# Patient Record
Sex: MALE | Race: WHITE | Employment: FULL TIME | URBAN - METROPOLITAN AREA
[De-identification: names, ages, dates, MRNs, and addresses within clinical notes are randomized per-mention and may not be internally consistent; named-entity substitution may affect disease eponyms.]

---

## 2017-10-09 ENCOUNTER — OFFICE VISIT (OUTPATIENT)
Dept: INTERNAL MEDICINE CLINIC | Facility: CLINIC | Age: 27
End: 2017-10-09

## 2017-10-09 VITALS
HEIGHT: 69 IN | WEIGHT: 183 LBS | DIASTOLIC BLOOD PRESSURE: 88 MMHG | RESPIRATION RATE: 16 BRPM | SYSTOLIC BLOOD PRESSURE: 138 MMHG | BODY MASS INDEX: 27.11 KG/M2 | HEART RATE: 63 BPM

## 2017-10-09 DIAGNOSIS — Z00.00 PHYSICAL EXAM, ANNUAL: Primary | ICD-10-CM

## 2017-10-09 DIAGNOSIS — H61.23 BILATERAL IMPACTED CERUMEN: ICD-10-CM

## 2017-10-09 PROCEDURE — 69210 REMOVE IMPACTED EAR WAX UNI: CPT | Performed by: INTERNAL MEDICINE

## 2017-10-09 PROCEDURE — 99385 PREV VISIT NEW AGE 18-39: CPT | Performed by: INTERNAL MEDICINE

## 2017-10-09 RX ORDER — ALBUTEROL SULFATE 90 UG/1
2 AEROSOL, METERED RESPIRATORY (INHALATION) EVERY 4 HOURS PRN
Qty: 1 INHALER | Refills: 6 | Status: SHIPPED | OUTPATIENT
Start: 2017-10-09 | End: 2018-10-09

## 2017-10-09 NOTE — PROGRESS NOTES
Gely Morton is a 24year old male who presents for a complete physical exam.   HPI:   Pt complains of anxiety with flying.       Immunization History  Administered            Date(s) Administered    DTAP                  08/10/1992  10/14/1992  01/1 History   Problem Relation Age of Onset   • Depression Mother    • Hypertension Paternal Grandfather    • Heart Disease Other      FAMILY H/O HEART DISEASE   • Hypertension Other      FAMILY H/O HTN   • Cancer Other      FAMILY H/O BONE CANCER   • Diabetes clubbing or edema  NEURO: Oriented times three,cranial nerves are intact,motor and sensory are grossly intact    ASSESSMENT AND PLAN:     1.  Physical exam, annual    Kerrijohn paul Bae is a 24year old male who presents for a complete physical exam.  Pt's

## 2018-01-09 ENCOUNTER — HOSPITAL ENCOUNTER (OUTPATIENT)
Age: 28
Discharge: HOME | End: 2018-01-09
Payer: COMMERCIAL

## 2018-01-09 VITALS
RESPIRATION RATE: 20 BRPM | BODY MASS INDEX: 28.14 KG/M2 | TEMPERATURE: 103 F | OXYGEN SATURATION: 98 % | WEIGHT: 190 LBS | SYSTOLIC BLOOD PRESSURE: 153 MMHG | HEIGHT: 69 IN | DIASTOLIC BLOOD PRESSURE: 84 MMHG | HEART RATE: 110 BPM

## 2018-01-09 DIAGNOSIS — J02.0 STREP PHARYNGITIS: Primary | ICD-10-CM

## 2018-01-09 LAB — S PYO AG THROAT QL: POSITIVE

## 2018-01-09 PROCEDURE — 99213 OFFICE O/P EST LOW 20 MIN: CPT

## 2018-01-09 PROCEDURE — 87430 STREP A AG IA: CPT

## 2018-01-09 PROCEDURE — 99204 OFFICE O/P NEW MOD 45 MIN: CPT

## 2018-01-09 RX ORDER — AMOXICILLIN 875 MG/1
875 TABLET, COATED ORAL 2 TIMES DAILY
Qty: 20 TABLET | Refills: 0 | Status: SHIPPED | OUTPATIENT
Start: 2018-01-09 | End: 2018-01-19

## 2018-01-09 RX ORDER — IBUPROFEN 600 MG/1
600 TABLET ORAL ONCE
Status: COMPLETED | OUTPATIENT
Start: 2018-01-09 | End: 2018-01-09

## 2018-01-09 NOTE — ED PROVIDER NOTES
[facility]    History   Patient presents with:  Sore Throat    Stated Complaint: Sore Throat    HPI    Patient is a 29-year-old male who presents for evaluation of sore throat, body aches and chills ×4 da Ear: External ear normal.   Nose: Nose normal.   Mouth/Throat: Mucous membranes are normal. Posterior oropharyngeal erythema present. No oropharyngeal exudate or tonsillar abscesses.    1+ tonsils bilaterally with moderate swelling and erythema, no exudates

## 2019-05-01 ENCOUNTER — LAB ENCOUNTER (OUTPATIENT)
Dept: LAB | Facility: HOSPITAL | Age: 29
End: 2019-05-01
Attending: INTERNAL MEDICINE
Payer: COMMERCIAL

## 2019-05-01 ENCOUNTER — OFFICE VISIT (OUTPATIENT)
Dept: INTERNAL MEDICINE CLINIC | Facility: CLINIC | Age: 29
End: 2019-05-01
Payer: COMMERCIAL

## 2019-05-01 VITALS
HEART RATE: 83 BPM | SYSTOLIC BLOOD PRESSURE: 136 MMHG | HEIGHT: 69 IN | RESPIRATION RATE: 16 BRPM | BODY MASS INDEX: 28.14 KG/M2 | WEIGHT: 190 LBS | DIASTOLIC BLOOD PRESSURE: 84 MMHG

## 2019-05-01 DIAGNOSIS — R10.13 EPIGASTRIC ABDOMINAL PAIN: ICD-10-CM

## 2019-05-01 DIAGNOSIS — R10.13 EPIGASTRIC ABDOMINAL PAIN: Primary | ICD-10-CM

## 2019-05-01 PROCEDURE — 36415 COLL VENOUS BLD VENIPUNCTURE: CPT

## 2019-05-01 PROCEDURE — 85025 COMPLETE CBC W/AUTO DIFF WBC: CPT

## 2019-05-01 PROCEDURE — 80053 COMPREHEN METABOLIC PANEL: CPT

## 2019-05-01 PROCEDURE — 81003 URINALYSIS AUTO W/O SCOPE: CPT

## 2019-05-01 PROCEDURE — 99214 OFFICE O/P EST MOD 30 MIN: CPT | Performed by: INTERNAL MEDICINE

## 2019-05-01 PROCEDURE — 99212 OFFICE O/P EST SF 10 MIN: CPT | Performed by: INTERNAL MEDICINE

## 2019-05-01 RX ORDER — ALPRAZOLAM 0.5 MG/1
TABLET ORAL
Qty: 10 TABLET | Refills: 0 | Status: SHIPPED | OUTPATIENT
Start: 2019-05-01

## 2019-05-06 NOTE — PROGRESS NOTES
Dacia Murphy is a 34year old male. HPI:   1. Epigastric pain     Has been having intermittent epigastric pain that is short lived and comes and goes. NOT positional, not re;lated to what he eats. Bananas seem to aggravate the problem.  Can eat spic pain becomes severe go to the Emergency Room for evaluation.    - COMP METABOLIC PANEL (14); Future  - URINALYSIS, ROUTINE; Future  - CBC WITH DIFFERENTIAL WITH PLATELET; Future    The patient indicates understanding of these issues and agrees to the plan.

## 2019-11-13 ENCOUNTER — HOSPITAL ENCOUNTER (OUTPATIENT)
Age: 29
Discharge: HOME | End: 2019-11-13
Payer: COMMERCIAL

## 2019-11-13 VITALS
SYSTOLIC BLOOD PRESSURE: 162 MMHG | RESPIRATION RATE: 20 BRPM | HEART RATE: 89 BPM | DIASTOLIC BLOOD PRESSURE: 90 MMHG | HEIGHT: 68 IN | BODY MASS INDEX: 30.31 KG/M2 | TEMPERATURE: 98 F | OXYGEN SATURATION: 99 % | WEIGHT: 200 LBS

## 2019-11-13 DIAGNOSIS — H60.312 ACUTE DIFFUSE OTITIS EXTERNA OF LEFT EAR: Primary | ICD-10-CM

## 2019-11-13 DIAGNOSIS — H61.23 BILATERAL IMPACTED CERUMEN: ICD-10-CM

## 2019-11-13 PROCEDURE — 99213 OFFICE O/P EST LOW 20 MIN: CPT

## 2019-11-13 PROCEDURE — 99214 OFFICE O/P EST MOD 30 MIN: CPT

## 2019-11-13 RX ORDER — AMOXICILLIN 875 MG/1
875 TABLET, COATED ORAL 2 TIMES DAILY
Qty: 20 TABLET | Refills: 0 | Status: SHIPPED | OUTPATIENT
Start: 2019-11-13 | End: 2019-11-23

## 2019-11-13 RX ORDER — OFLOXACIN 3 MG/ML
10 SOLUTION AURICULAR (OTIC) DAILY
Qty: 1 BOTTLE | Refills: 0 | Status: SHIPPED | OUTPATIENT
Start: 2019-11-13 | End: 2019-11-20

## 2019-11-14 NOTE — ED PROVIDER NOTES
Patient presents with:  Ear Pain      HPI:     Gio Bush is a 34year old male who presents with a chief complaint of left ear hearing loss and discomfort for the past couple days. The patient has a history of cerumen impaction.   No drainage fro Not on file        Attends meetings of clubs or organizations: Not on file        Relationship status: Not on file      Intimate partner violence:        Fear of current or ex partner: Not on file        Emotionally abused: Not on file        Physically ab 0      Labs performed this visit:  No results found for this or any previous visit (from the past 10 hour(s)). MDM:  Both ears were irrigated with warm water. A large amount of cerumen was removed. The left ear canal is red and swollen.   There is mild

## 2020-05-20 ENCOUNTER — TELEPHONE (OUTPATIENT)
Dept: INTERNAL MEDICINE CLINIC | Facility: CLINIC | Age: 30
End: 2020-05-20

## 2020-05-20 ENCOUNTER — VIRTUAL PHONE E/M (OUTPATIENT)
Dept: INTERNAL MEDICINE CLINIC | Facility: CLINIC | Age: 30
End: 2020-05-20
Payer: COMMERCIAL

## 2020-05-20 DIAGNOSIS — H60.501 ACUTE OTITIS EXTERNA OF RIGHT EAR, UNSPECIFIED TYPE: Primary | ICD-10-CM

## 2020-05-20 PROCEDURE — 99213 OFFICE O/P EST LOW 20 MIN: CPT | Performed by: PHYSICIAN ASSISTANT

## 2020-05-20 RX ORDER — AMOXICILLIN AND CLAVULANATE POTASSIUM 875; 125 MG/1; MG/1
1 TABLET, FILM COATED ORAL 2 TIMES DAILY
Qty: 20 TABLET | Refills: 0 | Status: SHIPPED | OUTPATIENT
Start: 2020-05-20 | End: 2020-05-30

## 2020-05-20 NOTE — PROGRESS NOTES
Virtual Telephone Check-In    Silvana Cee verbally consents to a Virtual/Telephone Check-In visit on 05/20/20. Patient has been referred to the [facility] website at [email or web address] to review the yearly Consent to Treat document.     Patient und

## 2020-05-20 NOTE — TELEPHONE ENCOUNTER
Patient called and advised that he had a scheduled phone visit at 9:00 AM, but never received a call. He said his phone should allow for private numbers to call into his cell. Please Advise.     Back Up Number to call: [phone]

## 2020-05-20 NOTE — TELEPHONE ENCOUNTER
Called patient 2 times at number listed and there was no answer. Used doximity so clinic number should show up.

## 2020-06-03 ENCOUNTER — TELEPHONE (OUTPATIENT)
Dept: INTERNAL MEDICINE CLINIC | Facility: CLINIC | Age: 30
End: 2020-06-03

## 2020-06-03 NOTE — TELEPHONE ENCOUNTER
See my chart message below. Spoke with pt who states the day after taking Augmentin loss taste and smell. Pt states still has diminished sense of smell and taste. Per pt his mom did test Mom positive for Covid 19.    Pt saw her May 16 last and pe

## 2020-06-03 NOTE — TELEPHONE ENCOUNTER
----- Message from Rowdy Mosqueda sent at 6/3/2020 11:16 AM CDT -----  Regarding: Other  Contact: [phone]  Murray Silva,    I finished the full dose of the Augmentin, and my ear is feeling much better. I am not sure if it's a side effect.  But right

## 2020-06-04 NOTE — TELEPHONE ENCOUNTER
Advised patient of Shira Ramirez   note. Patient verbalized understanding. States will monitor symptoms.

## 2020-10-06 ENCOUNTER — OFFICE VISIT (OUTPATIENT)
Dept: INTERNAL MEDICINE CLINIC | Facility: CLINIC | Age: 30
End: 2020-10-06
Payer: COMMERCIAL

## 2020-10-06 ENCOUNTER — LAB ENCOUNTER (OUTPATIENT)
Dept: LAB | Age: 30
End: 2020-10-06
Attending: INTERNAL MEDICINE
Payer: COMMERCIAL

## 2020-10-06 VITALS
SYSTOLIC BLOOD PRESSURE: 138 MMHG | HEART RATE: 80 BPM | RESPIRATION RATE: 16 BRPM | HEIGHT: 68 IN | BODY MASS INDEX: 32.28 KG/M2 | WEIGHT: 213 LBS | DIASTOLIC BLOOD PRESSURE: 84 MMHG

## 2020-10-06 DIAGNOSIS — Z00.00 PHYSICAL EXAM, ANNUAL: ICD-10-CM

## 2020-10-06 DIAGNOSIS — Z20.822 ENCOUNTER FOR LABORATORY TESTING FOR COVID-19 VIRUS: ICD-10-CM

## 2020-10-06 DIAGNOSIS — Z00.00 PHYSICAL EXAM, ANNUAL: Primary | ICD-10-CM

## 2020-10-06 PROCEDURE — 86769 SARS-COV-2 COVID-19 ANTIBODY: CPT

## 2020-10-06 PROCEDURE — 3008F BODY MASS INDEX DOCD: CPT | Performed by: INTERNAL MEDICINE

## 2020-10-06 PROCEDURE — 81003 URINALYSIS AUTO W/O SCOPE: CPT

## 2020-10-06 PROCEDURE — 85025 COMPLETE CBC W/AUTO DIFF WBC: CPT

## 2020-10-06 PROCEDURE — 84443 ASSAY THYROID STIM HORMONE: CPT

## 2020-10-06 PROCEDURE — 3075F SYST BP GE 130 - 139MM HG: CPT | Performed by: INTERNAL MEDICINE

## 2020-10-06 PROCEDURE — 80053 COMPREHEN METABOLIC PANEL: CPT

## 2020-10-06 PROCEDURE — 3079F DIAST BP 80-89 MM HG: CPT | Performed by: INTERNAL MEDICINE

## 2020-10-06 PROCEDURE — 36415 COLL VENOUS BLD VENIPUNCTURE: CPT

## 2020-10-06 PROCEDURE — 80061 LIPID PANEL: CPT

## 2020-10-06 PROCEDURE — 99395 PREV VISIT EST AGE 18-39: CPT | Performed by: INTERNAL MEDICINE

## 2020-10-06 NOTE — PROGRESS NOTES
Silvana Cee is a 31year old male who presents for a complete physical exam.   HPI:   Pt complains of exposure to covid vaccine.       Immunization History  Administered            Date(s) Administered    DTAP                  08/10/1992  10/14/1992 Problem Relation Age of Onset   • Depression Mother    • Hypertension Paternal Grandfather    • Heart Disease Other         FAMILY H/O HEART DISEASE   • Hypertension Other         FAMILY H/O HTN   • Cancer Other         FAMILY H/O BONE CANCER   • Diabete back  EXTREMITIES: no cyanosis, clubbing or edema  NEURO: Oriented times three,cranial nerves are intact,motor and sensory are grossly intact    ASSESSMENT AND PLAN:   1.  Physical exam, annual    Tankaylyn Rachelle is a 31year old male who presents for a

## 2022-10-11 ENCOUNTER — LAB ENCOUNTER (OUTPATIENT)
Dept: LAB | Age: 32
End: 2022-10-11
Attending: INTERNAL MEDICINE
Payer: COMMERCIAL

## 2022-10-11 ENCOUNTER — OFFICE VISIT (OUTPATIENT)
Dept: INTERNAL MEDICINE CLINIC | Facility: CLINIC | Age: 32
End: 2022-10-11
Payer: COMMERCIAL

## 2022-10-11 VITALS
HEIGHT: 68 IN | HEART RATE: 74 BPM | TEMPERATURE: 98 F | BODY MASS INDEX: 34.56 KG/M2 | OXYGEN SATURATION: 98 % | WEIGHT: 228 LBS | RESPIRATION RATE: 16 BRPM | DIASTOLIC BLOOD PRESSURE: 84 MMHG | SYSTOLIC BLOOD PRESSURE: 132 MMHG

## 2022-10-11 DIAGNOSIS — Z13.0 SCREENING FOR BLOOD DISEASE: ICD-10-CM

## 2022-10-11 DIAGNOSIS — F41.9 ANXIETY: ICD-10-CM

## 2022-10-11 DIAGNOSIS — Z13.228 SCREENING FOR METABOLIC DISORDER: ICD-10-CM

## 2022-10-11 DIAGNOSIS — Z13.29 SCREENING FOR THYROID DISORDER: ICD-10-CM

## 2022-10-11 DIAGNOSIS — Z13.220 SCREENING FOR LIPID DISORDERS: ICD-10-CM

## 2022-10-11 DIAGNOSIS — R10.13 EPIGASTRIC DISCOMFORT: ICD-10-CM

## 2022-10-11 DIAGNOSIS — E66.9 CLASS 1 OBESITY: ICD-10-CM

## 2022-10-11 DIAGNOSIS — Z00.00 ANNUAL PHYSICAL EXAM: Primary | ICD-10-CM

## 2022-10-11 DIAGNOSIS — Z00.00 ANNUAL PHYSICAL EXAM: ICD-10-CM

## 2022-10-11 LAB
ALBUMIN SERPL-MCNC: 4.3 G/DL (ref 3.4–5)
ALBUMIN/GLOB SERPL: 1.2 {RATIO} (ref 1–2)
ALP LIVER SERPL-CCNC: 92 U/L
ALT SERPL-CCNC: 43 U/L
ANION GAP SERPL CALC-SCNC: 6 MMOL/L (ref 0–18)
AST SERPL-CCNC: 21 U/L (ref 15–37)
BASOPHILS # BLD AUTO: 0.04 X10(3) UL (ref 0–0.2)
BASOPHILS NFR BLD AUTO: 0.6 %
BILIRUB SERPL-MCNC: 0.8 MG/DL (ref 0.1–2)
BUN BLD-MCNC: 14 MG/DL (ref 7–18)
BUN/CREAT SERPL: 13 (ref 10–20)
CALCIUM BLD-MCNC: 9.3 MG/DL (ref 8.5–10.1)
CHLORIDE SERPL-SCNC: 104 MMOL/L (ref 98–112)
CHOLEST SERPL-MCNC: 200 MG/DL (ref ?–200)
CO2 SERPL-SCNC: 28 MMOL/L (ref 21–32)
CREAT BLD-MCNC: 1.08 MG/DL
DEPRECATED RDW RBC AUTO: 43.6 FL (ref 35.1–46.3)
EOSINOPHIL # BLD AUTO: 0.24 X10(3) UL (ref 0–0.7)
EOSINOPHIL NFR BLD AUTO: 3.6 %
ERYTHROCYTE [DISTWIDTH] IN BLOOD BY AUTOMATED COUNT: 13.6 % (ref 11–15)
FASTING PATIENT LIPID ANSWER: YES
FASTING STATUS PATIENT QL REPORTED: YES
GFR SERPLBLD BASED ON 1.73 SQ M-ARVRAT: 95 ML/MIN/1.73M2 (ref 60–?)
GLOBULIN PLAS-MCNC: 3.5 G/DL (ref 2.8–4.4)
GLUCOSE BLD-MCNC: 91 MG/DL (ref 70–99)
HCT VFR BLD AUTO: 50.9 %
HDLC SERPL-MCNC: 63 MG/DL (ref 40–59)
HGB BLD-MCNC: 16.6 G/DL
IMM GRANULOCYTES # BLD AUTO: 0.04 X10(3) UL (ref 0–1)
IMM GRANULOCYTES NFR BLD: 0.6 %
LDLC SERPL CALC-MCNC: 126 MG/DL (ref ?–100)
LYMPHOCYTES # BLD AUTO: 1.71 X10(3) UL (ref 1–4)
LYMPHOCYTES NFR BLD AUTO: 25.9 %
MCH RBC QN AUTO: 28.6 PG (ref 26–34)
MCHC RBC AUTO-ENTMCNC: 32.6 G/DL (ref 31–37)
MCV RBC AUTO: 87.8 FL
MONOCYTES # BLD AUTO: 0.53 X10(3) UL (ref 0.1–1)
MONOCYTES NFR BLD AUTO: 8 %
NEUTROPHILS # BLD AUTO: 4.05 X10 (3) UL (ref 1.5–7.7)
NEUTROPHILS # BLD AUTO: 4.05 X10(3) UL (ref 1.5–7.7)
NEUTROPHILS NFR BLD AUTO: 61.3 %
NONHDLC SERPL-MCNC: 137 MG/DL (ref ?–130)
OSMOLALITY SERPL CALC.SUM OF ELEC: 286 MOSM/KG (ref 275–295)
PLATELET # BLD AUTO: 201 10(3)UL (ref 150–450)
POTASSIUM SERPL-SCNC: 3.8 MMOL/L (ref 3.5–5.1)
PROT SERPL-MCNC: 7.8 G/DL (ref 6.4–8.2)
RBC # BLD AUTO: 5.8 X10(6)UL
SODIUM SERPL-SCNC: 138 MMOL/L (ref 136–145)
TRIGL SERPL-MCNC: 58 MG/DL (ref 30–149)
TSI SER-ACNC: 2.64 MIU/ML (ref 0.36–3.74)
VLDLC SERPL CALC-MCNC: 10 MG/DL (ref 0–30)
WBC # BLD AUTO: 6.6 X10(3) UL (ref 4–11)

## 2022-10-11 PROCEDURE — 90471 IMMUNIZATION ADMIN: CPT | Performed by: INTERNAL MEDICINE

## 2022-10-11 PROCEDURE — 3008F BODY MASS INDEX DOCD: CPT | Performed by: INTERNAL MEDICINE

## 2022-10-11 PROCEDURE — 90686 IIV4 VACC NO PRSV 0.5 ML IM: CPT | Performed by: INTERNAL MEDICINE

## 2022-10-11 PROCEDURE — 80050 GENERAL HEALTH PANEL: CPT | Performed by: INTERNAL MEDICINE

## 2022-10-11 PROCEDURE — 80061 LIPID PANEL: CPT | Performed by: INTERNAL MEDICINE

## 2022-10-11 PROCEDURE — 3079F DIAST BP 80-89 MM HG: CPT | Performed by: INTERNAL MEDICINE

## 2022-10-11 PROCEDURE — 3075F SYST BP GE 130 - 139MM HG: CPT | Performed by: INTERNAL MEDICINE

## 2022-10-11 PROCEDURE — 99385 PREV VISIT NEW AGE 18-39: CPT | Performed by: INTERNAL MEDICINE

## 2022-10-11 RX ORDER — FAMOTIDINE 40 MG/1
40 TABLET, FILM COATED ORAL DAILY
Qty: 90 TABLET | Refills: 0 | Status: SHIPPED | OUTPATIENT
Start: 2022-10-11

## 2022-10-11 RX ORDER — OMEPRAZOLE 20 MG/1
20 CAPSULE, DELAYED RELEASE ORAL
COMMUNITY

## 2022-10-31 ENCOUNTER — HOSPITAL ENCOUNTER (OUTPATIENT)
Dept: ULTRASOUND IMAGING | Age: 32
Discharge: HOME | End: 2022-10-31
Attending: INTERNAL MEDICINE
Payer: COMMERCIAL

## 2022-10-31 DIAGNOSIS — R10.13 EPIGASTRIC DISCOMFORT: ICD-10-CM

## 2022-10-31 PROCEDURE — 76700 US EXAM ABDOM COMPLETE: CPT | Performed by: INTERNAL MEDICINE

## 2022-11-07 ENCOUNTER — PATIENT MESSAGE (OUTPATIENT)
Dept: INTERNAL MEDICINE CLINIC | Facility: CLINIC | Age: 32
End: 2022-11-07

## 2022-11-08 RX ORDER — ALPRAZOLAM 0.5 MG/1
TABLET ORAL
Qty: 10 TABLET | Refills: 0 | Status: SHIPPED | OUTPATIENT
Start: 2022-11-08

## 2022-11-08 NOTE — TELEPHONE ENCOUNTER
Last VISIT 10/11/22    Last CPE 10/11/22    Last REFILL  2019    Last LABS 10/11/22 CPE labs done    No future appointments. Please Approve or Deny.

## 2022-12-21 NOTE — TELEPHONE ENCOUNTER
I did try to call pt, once yesterday and again just now. Please let him know that's not a typical SE of augmentin, possible exposure to covid which could be causing his current sx. Advised to monitor and may obtain covid test from [facility] facility.  Should use Will route to Dr. Slater for recommendations for Eliquis hold

## 2023-01-15 RX ORDER — FAMOTIDINE 40 MG/1
TABLET, FILM COATED ORAL
Qty: 90 TABLET | Refills: 0 | Status: CANCELLED | OUTPATIENT
Start: 2023-01-15

## 2023-01-16 RX ORDER — FAMOTIDINE 40 MG/1
TABLET, FILM COATED ORAL
Qty: 90 TABLET | Refills: 0 | Status: SHIPPED | OUTPATIENT
Start: 2023-01-16

## (undated) NOTE — LETTER
02/15/18        [address] [facility]      Dear Loc Pino records indicate that you have outstanding lab work and or testing that was ordered for you and has not yet been completed:          TSH [E]      Urinalysis, R

## (undated) NOTE — LETTER
[facility] [place]  [address] [facility]  Dept: [phone]  Dept Fax: [phone]  Loc: [phone]      January 9, 2018    Patient: Erica Nation   Date of Visit: 1/9/2018       To Whom It May Concern:     Al

## (undated) NOTE — LETTER
06/18/18        [address] [facility]      Dear Dipti Morillo records indicate that you have outstanding lab work and or testing that was ordered for you and has not yet been completed:          TSH [E]      Urinalysis, R

## (undated) NOTE — LETTER
[facility] ACTION PLAN for [place] [facility]     : 1990     Date: 10/09/17  Doctor:  Helane Lundborg, MD  Phone for doctor or clinic: [address], [address] [place] [address]  [phone]           ILEANA Esquivel If your symptoms do not improve in ONE hour -  go to the emergency room or call 911 immediately! If symptoms improve, call office for appointment immediately.     Albuterol inhaler 2 puffs every 20 minutes for three treatments       Don't forget:  · Rinse

## (undated) NOTE — LETTER
04/16/18        [address] [facility]      Dear Ganga Saucedo records indicate that you have outstanding lab work and or testing that was ordered for you and has not yet been completed:          TSH [E]      Urinalysis, R